# Patient Record
Sex: FEMALE | Race: ASIAN | NOT HISPANIC OR LATINO | Employment: STUDENT | URBAN - METROPOLITAN AREA
[De-identification: names, ages, dates, MRNs, and addresses within clinical notes are randomized per-mention and may not be internally consistent; named-entity substitution may affect disease eponyms.]

---
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
## 2019-07-17 ENCOUNTER — AMBULATORY - HEALTHEAST (OUTPATIENT)
Dept: FAMILY MEDICINE | Facility: CLINIC | Age: 19
End: 2019-07-17

## 2019-07-17 ENCOUNTER — COMMUNICATION - HEALTHEAST (OUTPATIENT)
Dept: FAMILY MEDICINE | Facility: CLINIC | Age: 19
End: 2019-07-17

## 2019-08-05 ENCOUNTER — COMMUNICATION - HEALTHEAST (OUTPATIENT)
Dept: FAMILY MEDICINE | Facility: CLINIC | Age: 19
End: 2019-08-05

## 2019-08-30 ENCOUNTER — OFFICE VISIT - HEALTHEAST (OUTPATIENT)
Dept: FAMILY MEDICINE | Facility: CLINIC | Age: 19
End: 2019-08-30

## 2019-08-30 DIAGNOSIS — Z23 NEED FOR TETANUS BOOSTER: ICD-10-CM

## 2019-08-30 DIAGNOSIS — Z71.84 TRAVEL ADVICE ENCOUNTER: ICD-10-CM

## 2019-08-30 ASSESSMENT — MIFFLIN-ST. JEOR: SCORE: 1363.82

## 2020-08-07 ENCOUNTER — RECORDS - HEALTHEAST (OUTPATIENT)
Dept: GENERAL RADIOLOGY | Facility: CLINIC | Age: 20
End: 2020-08-07

## 2020-08-07 ENCOUNTER — OFFICE VISIT - HEALTHEAST (OUTPATIENT)
Dept: FAMILY MEDICINE | Facility: CLINIC | Age: 20
End: 2020-08-07

## 2020-08-07 DIAGNOSIS — Z11.3 SCREEN FOR STD (SEXUALLY TRANSMITTED DISEASE): ICD-10-CM

## 2020-08-07 DIAGNOSIS — Z00.00 ROUTINE GENERAL MEDICAL EXAMINATION AT A HEALTH CARE FACILITY: ICD-10-CM

## 2020-08-07 DIAGNOSIS — R76.12 NONSPECIFIC REACTION TO CELL MEDIATED IMMUNITY MEASUREMENT OF GAMMA INTERFERON ANTIGEN RESPONSE WITHOUT ACTIVE TUBERCULOSIS: ICD-10-CM

## 2020-08-07 DIAGNOSIS — R76.12 POSITIVE QUANTIFERON-TB GOLD TEST: ICD-10-CM

## 2020-08-07 LAB
CLUE CELLS: NORMAL
FASTING STATUS PATIENT QL REPORTED: NO
GLUCOSE BLD-MCNC: 87 MG/DL (ref 74–125)
HGB BLD-MCNC: 12.8 G/DL (ref 12–16)
HIV 1+2 AB+HIV1 P24 AG SERPL QL IA: NEGATIVE
LDLC SERPL CALC-MCNC: 116 MG/DL
TRICHOMONAS, WET PREP: NORMAL
YEAST, WET PREP: NORMAL

## 2020-08-07 ASSESSMENT — MIFFLIN-ST. JEOR: SCORE: 1366.43

## 2020-08-08 LAB — T PALLIDUM AB SER QL: NEGATIVE

## 2020-08-10 LAB
HPV SOURCE: ABNORMAL
HUMAN PAPILLOMA VIRUS 16 DNA: NEGATIVE
HUMAN PAPILLOMA VIRUS 18 DNA: NEGATIVE
HUMAN PAPILLOMA VIRUS FINAL DIAGNOSIS: ABNORMAL
HUMAN PAPILLOMA VIRUS OTHER HR: POSITIVE
SPECIMEN DESCRIPTION: ABNORMAL

## 2020-08-11 LAB
C TRACH DNA SPEC QL PROBE+SIG AMP: NEGATIVE
N GONORRHOEA DNA SPEC QL NAA+PROBE: NEGATIVE

## 2020-09-18 ENCOUNTER — OFFICE VISIT - HEALTHEAST (OUTPATIENT)
Dept: FAMILY MEDICINE | Facility: CLINIC | Age: 20
End: 2020-09-18

## 2020-09-18 DIAGNOSIS — L98.9 SKIN LESION: ICD-10-CM

## 2020-09-18 DIAGNOSIS — F41.1 GENERALIZED ANXIETY DISORDER: ICD-10-CM

## 2020-12-31 ENCOUNTER — OFFICE VISIT - HEALTHEAST (OUTPATIENT)
Dept: FAMILY MEDICINE | Facility: CLINIC | Age: 20
End: 2020-12-31

## 2020-12-31 DIAGNOSIS — Z20.822 SUSPECTED COVID-19 VIRUS INFECTION: ICD-10-CM

## 2021-01-05 ENCOUNTER — OFFICE VISIT - HEALTHEAST (OUTPATIENT)
Dept: FAMILY MEDICINE | Facility: CLINIC | Age: 21
End: 2021-01-05

## 2021-01-05 DIAGNOSIS — Z30.011 ENCOUNTER FOR INITIAL PRESCRIPTION OF CONTRACEPTIVE PILLS: ICD-10-CM

## 2021-01-05 DIAGNOSIS — R53.82 CHRONIC FATIGUE: ICD-10-CM

## 2021-01-05 LAB
ALBUMIN SERPL-MCNC: 4.6 G/DL (ref 3.5–5)
ALP SERPL-CCNC: 68 U/L (ref 45–120)
ALT SERPL W P-5'-P-CCNC: 15 U/L (ref 0–45)
ANION GAP SERPL CALCULATED.3IONS-SCNC: 12 MMOL/L (ref 5–18)
AST SERPL W P-5'-P-CCNC: 22 U/L (ref 0–40)
BASOPHILS # BLD AUTO: 0 THOU/UL (ref 0–0.2)
BASOPHILS NFR BLD AUTO: 1 % (ref 0–2)
BILIRUB SERPL-MCNC: 0.4 MG/DL (ref 0–1)
BUN SERPL-MCNC: 19 MG/DL (ref 8–22)
CALCIUM SERPL-MCNC: 9.5 MG/DL (ref 8.5–10.5)
CHLORIDE BLD-SCNC: 103 MMOL/L (ref 98–107)
CO2 SERPL-SCNC: 25 MMOL/L (ref 22–31)
CREAT SERPL-MCNC: 0.79 MG/DL (ref 0.6–1.1)
EOSINOPHIL # BLD AUTO: 0.1 THOU/UL (ref 0–0.4)
EOSINOPHIL NFR BLD AUTO: 1 % (ref 0–6)
ERYTHROCYTE [DISTWIDTH] IN BLOOD BY AUTOMATED COUNT: 11.4 % (ref 11–14.5)
FERRITIN SERPL-MCNC: 13 NG/ML (ref 10–130)
GFR SERPL CREATININE-BSD FRML MDRD: >60 ML/MIN/1.73M2
GLUCOSE BLD-MCNC: 90 MG/DL (ref 70–125)
HCT VFR BLD AUTO: 41 % (ref 35–47)
HGB BLD-MCNC: 13.9 G/DL (ref 12–16)
IRON SATN MFR SERPL: 14 % (ref 20–50)
IRON SERPL-MCNC: 55 UG/DL (ref 42–175)
LYMPHOCYTES # BLD AUTO: 1.5 THOU/UL (ref 0.8–4.4)
LYMPHOCYTES NFR BLD AUTO: 28 % (ref 20–40)
MCH RBC QN AUTO: 31.9 PG (ref 27–34)
MCHC RBC AUTO-ENTMCNC: 34 G/DL (ref 32–36)
MCV RBC AUTO: 94 FL (ref 80–100)
MONOCYTES # BLD AUTO: 0.3 THOU/UL (ref 0–0.9)
MONOCYTES NFR BLD AUTO: 5 % (ref 2–10)
NEUTROPHILS # BLD AUTO: 3.4 THOU/UL (ref 2–7.7)
NEUTROPHILS NFR BLD AUTO: 65 % (ref 50–70)
PLATELET # BLD AUTO: 267 THOU/UL (ref 140–440)
PMV BLD AUTO: 8.7 FL (ref 7–10)
POTASSIUM BLD-SCNC: 4.7 MMOL/L (ref 3.5–5)
PROT SERPL-MCNC: 8.3 G/DL (ref 6–8)
RBC # BLD AUTO: 4.36 MILL/UL (ref 3.8–5.4)
SODIUM SERPL-SCNC: 140 MMOL/L (ref 136–145)
TIBC SERPL-MCNC: 399 UG/DL (ref 313–563)
TRANSFERRIN SERPL-MCNC: 319 MG/DL (ref 212–360)
TSH SERPL DL<=0.005 MIU/L-ACNC: 2.87 UIU/ML (ref 0.3–5)
VIT B12 SERPL-MCNC: 410 PG/ML (ref 213–816)
WBC: 5.3 THOU/UL (ref 4–11)

## 2021-01-06 LAB
25(OH)D3 SERPL-MCNC: 10.1 NG/ML (ref 30–80)
25(OH)D3 SERPL-MCNC: 10.1 NG/ML (ref 30–80)

## 2021-01-07 ENCOUNTER — AMBULATORY - HEALTHEAST (OUTPATIENT)
Dept: FAMILY MEDICINE | Facility: CLINIC | Age: 21
End: 2021-01-07

## 2021-01-07 DIAGNOSIS — E55.9 VITAMIN D DEFICIENCY: ICD-10-CM

## 2021-02-19 ENCOUNTER — AMBULATORY - HEALTHEAST (OUTPATIENT)
Dept: FAMILY MEDICINE | Facility: CLINIC | Age: 21
End: 2021-02-19

## 2021-02-19 ENCOUNTER — COMMUNICATION - HEALTHEAST (OUTPATIENT)
Dept: FAMILY MEDICINE | Facility: CLINIC | Age: 21
End: 2021-02-19

## 2021-02-19 ENCOUNTER — OFFICE VISIT - HEALTHEAST (OUTPATIENT)
Dept: FAMILY MEDICINE | Facility: CLINIC | Age: 21
End: 2021-02-19

## 2021-02-19 DIAGNOSIS — Z20.822 EXPOSURE TO COVID-19 VIRUS: ICD-10-CM

## 2021-03-25 ENCOUNTER — COMMUNICATION - HEALTHEAST (OUTPATIENT)
Dept: FAMILY MEDICINE | Facility: CLINIC | Age: 21
End: 2021-03-25

## 2021-04-29 ENCOUNTER — OFFICE VISIT (OUTPATIENT)
Dept: FAMILY MEDICINE | Facility: CLINIC | Age: 21
End: 2021-04-29
Payer: COMMERCIAL

## 2021-04-29 VITALS
SYSTOLIC BLOOD PRESSURE: 98 MMHG | WEIGHT: 133.4 LBS | DIASTOLIC BLOOD PRESSURE: 62 MMHG | HEIGHT: 62 IN | HEART RATE: 81 BPM | BODY MASS INDEX: 24.55 KG/M2 | RESPIRATION RATE: 15 BRPM | TEMPERATURE: 97.9 F

## 2021-04-29 DIAGNOSIS — Z86.2 HISTORY OF ANEMIA: ICD-10-CM

## 2021-04-29 DIAGNOSIS — Z11.3 SCREENING FOR STDS (SEXUALLY TRANSMITTED DISEASES): ICD-10-CM

## 2021-04-29 DIAGNOSIS — Z22.7 LATENT TUBERCULOSIS BY BLOOD TEST: ICD-10-CM

## 2021-04-29 DIAGNOSIS — Z12.4 SCREENING FOR MALIGNANT NEOPLASM OF CERVIX: ICD-10-CM

## 2021-04-29 DIAGNOSIS — R79.89 LOW VITAMIN D LEVEL: ICD-10-CM

## 2021-04-29 DIAGNOSIS — Z00.00 ROUTINE GENERAL MEDICAL EXAMINATION AT A HEALTH CARE FACILITY: Primary | ICD-10-CM

## 2021-04-29 LAB
BASOPHILS # BLD AUTO: 0 10E9/L (ref 0–0.2)
BASOPHILS NFR BLD AUTO: 0.4 %
DIFFERENTIAL METHOD BLD: NORMAL
EOSINOPHIL # BLD AUTO: 0 10E9/L (ref 0–0.7)
EOSINOPHIL NFR BLD AUTO: 0.6 %
ERYTHROCYTE [DISTWIDTH] IN BLOOD BY AUTOMATED COUNT: 12.2 % (ref 10–15)
HCT VFR BLD AUTO: 37.5 % (ref 35–47)
HGB BLD-MCNC: 11.9 G/DL (ref 11.7–15.7)
LYMPHOCYTES # BLD AUTO: 1.8 10E9/L (ref 0.8–5.3)
LYMPHOCYTES NFR BLD AUTO: 35.4 %
MCH RBC QN AUTO: 30.4 PG (ref 26.5–33)
MCHC RBC AUTO-ENTMCNC: 31.7 G/DL (ref 31.5–36.5)
MCV RBC AUTO: 96 FL (ref 78–100)
MONOCYTES # BLD AUTO: 0.3 10E9/L (ref 0–1.3)
MONOCYTES NFR BLD AUTO: 5.6 %
NEUTROPHILS # BLD AUTO: 3 10E9/L (ref 1.6–8.3)
NEUTROPHILS NFR BLD AUTO: 58 %
PLATELET # BLD AUTO: 257 10E9/L (ref 150–450)
RBC # BLD AUTO: 3.91 10E12/L (ref 3.8–5.2)
SPECIMEN SOURCE: ABNORMAL
WBC # BLD AUTO: 5.2 10E9/L (ref 4–11)
WET PREP SPEC: ABNORMAL

## 2021-04-29 PROCEDURE — 36415 COLL VENOUS BLD VENIPUNCTURE: CPT | Performed by: INTERNAL MEDICINE

## 2021-04-29 PROCEDURE — 85025 COMPLETE CBC W/AUTO DIFF WBC: CPT | Performed by: INTERNAL MEDICINE

## 2021-04-29 PROCEDURE — 99213 OFFICE O/P EST LOW 20 MIN: CPT | Mod: 25 | Performed by: INTERNAL MEDICINE

## 2021-04-29 PROCEDURE — 87210 SMEAR WET MOUNT SALINE/INK: CPT | Performed by: INTERNAL MEDICINE

## 2021-04-29 PROCEDURE — 87591 N.GONORRHOEAE DNA AMP PROB: CPT | Performed by: INTERNAL MEDICINE

## 2021-04-29 PROCEDURE — 82306 VITAMIN D 25 HYDROXY: CPT | Performed by: INTERNAL MEDICINE

## 2021-04-29 PROCEDURE — G0145 SCR C/V CYTO,THINLAYER,RESCR: HCPCS | Performed by: INTERNAL MEDICINE

## 2021-04-29 PROCEDURE — 86481 TB AG RESPONSE T-CELL SUSP: CPT | Performed by: INTERNAL MEDICINE

## 2021-04-29 PROCEDURE — 99385 PREV VISIT NEW AGE 18-39: CPT | Performed by: INTERNAL MEDICINE

## 2021-04-29 PROCEDURE — 87491 CHLMYD TRACH DNA AMP PROBE: CPT | Performed by: INTERNAL MEDICINE

## 2021-04-29 RX ORDER — NORGESTIMATE AND ETHINYL ESTRADIOL 7DAYSX3 LO
1 KIT ORAL
COMMUNITY
Start: 2021-01-05 | End: 2021-11-09

## 2021-04-29 ASSESSMENT — ENCOUNTER SYMPTOMS
CHILLS: 0
JOINT SWELLING: 0
ABDOMINAL PAIN: 0
NERVOUS/ANXIOUS: 0
HEMATURIA: 0
NAUSEA: 0
CONSTIPATION: 0
WEAKNESS: 0
FEVER: 0
MYALGIAS: 0
HEARTBURN: 0
SHORTNESS OF BREATH: 0
ARTHRALGIAS: 0
EYE PAIN: 0
DYSURIA: 0
DIZZINESS: 0
FREQUENCY: 0
COUGH: 0
SORE THROAT: 0
PARESTHESIAS: 0
PALPITATIONS: 0
DIARRHEA: 0
HEMATOCHEZIA: 0
HEADACHES: 0
BREAST MASS: 0

## 2021-04-29 ASSESSMENT — MIFFLIN-ST. JEOR: SCORE: 1318.35

## 2021-04-29 NOTE — PROGRESS NOTES
Chief Complaint   Patient presents with     Physical     w/ pap        SUBJECTIVE:   CC: Jessica Ramirez is an 21 year old woman who presents for preventive health visit.     Patient has been advised of split billing requirements and indicates understanding: Yes  Healthy Habits:     Getting at least 3 servings of Calcium per day:  Yes    Bi-annual eye exam:  Yes    Dental care twice a year:  Yes    Sleep apnea or symptoms of sleep apnea:  None    Diet:  Regular (no restrictions) and Breakfast skipped    Frequency of exercise:  1 day/week    Duration of exercise:  15-30 minutes    Taking medications regularly:  Yes    Medication side effects:  None    PHQ-2 Total Score: 1    Additional concerns today:  No        Today's PHQ-2 Score:   PHQ-2 ( 1999 [employer]) 4/29/2021   Q1: Little interest or pleasure in doing things 0   Q2: Feeling down, depressed or hopeless 1   PHQ-2 Score 1   Q1: Little interest or pleasure in doing things Not at all   Q2: Feeling down, depressed or hopeless Several days   PHQ-2 Score 1       Abuse: Current or Past (Physical, Sexual or Emotional) - No  Do you feel safe in your environment? Yes    Have you ever done Advance Care Planning? (For example, a Health Directive, POLST, or a discussion with a medical provider or your loved ones about your wishes): No, advance care planning information given to patient to review.  Patient declined advance care planning discussion at this time.    Social History     Tobacco Use     Smoking status: Never Smoker     Smokeless tobacco: Never Used   Substance Use Topics     Alcohol use: Yes     Comment: once a month       Alcohol Use 4/29/2021   Prescreen: >3 drinks/day or >7 drinks/week? No       Reviewed orders with patient.  Reviewed health maintenance and updated orders accordingly - Yes  Labs reviewed in EPIC  BP Readings from Last 3 Encounters:   04/29/21 98/62    Wt Readings from Last 3 Encounters:   04/29/21 60.5 kg (133 lb 6.4 oz)                  Patient Active  Problem List   Diagnosis     Cervical high risk HPV (human papillomavirus) test positive     History reviewed. No pertinent surgical history.    Social History     Tobacco Use     Smoking status: Never Smoker     Smokeless tobacco: Never Used   Substance Use Topics     Alcohol use: Yes     Comment: once a month     History reviewed. No pertinent family history.      Current Outpatient Medications   Medication Sig Dispense Refill     norgestim-eth estrad triphasic (ORTHO TRI-CYCLEN LO) 0.18/0.215/0.25 MG-25 MCG tablet Take 1 tablet by mouth       No Known Allergies        History of abnormal Pap smear: NO - age 21-29 PAP every 3 years recommended     Reviewed and updated as needed this visit by clinical staff  Tobacco  Allergies  Meds   Med Hx  Surg Hx  Fam Hx  Soc Hx        Reviewed and updated as needed this visit by Provider  Tobacco  Allergies  Meds  Problems  Med Hx  Surg Hx  Fam Hx         History reviewed. No pertinent past medical history.   History reviewed. No pertinent surgical history.    Review of Systems   Constitutional: Negative for chills and fever.   HENT: Negative for congestion, ear pain, hearing loss and sore throat.    Eyes: Negative for pain and visual disturbance.   Respiratory: Negative for cough and shortness of breath.    Cardiovascular: Negative for chest pain, palpitations and peripheral edema.   Gastrointestinal: Negative for abdominal pain, constipation, diarrhea, heartburn, hematochezia and nausea.   Breasts:  Negative for tenderness, breast mass and discharge.   Genitourinary: Positive for vaginal discharge. Negative for dysuria, frequency, genital sores, hematuria, pelvic pain, urgency and vaginal bleeding.   Musculoskeletal: Negative for arthralgias, joint swelling and myalgias.   Skin: Negative for rash.   Neurological: Negative for dizziness, weakness, headaches and paresthesias.   Psychiatric/Behavioral: Positive for mood changes. The patient is not nervous/anxious.   "   hx of Pos PPD;   Latent tuberculosis:  Fall 2017; took 3 of 6 months of therapy INH and B6; She was born in [place]; emig[place] to [place] at age 3 or 4 ( 2004 or 3)     OBJECTIVE:   BP 98/62   Pulse 81   Temp 97.9  F (36.6  C) (Oral)   Resp 15   Ht 1.575 m (5' 2\")   Wt 60.5 kg (133 lb 6.4 oz)   LMP 04/18/2021 (Approximate)   BMI 24.40 kg/m    Physical Exam  GENERAL: healthy, alert and no distress  EYES: Eyes grossly normal to inspection  HENT: ear canals and TM's normal, nose and mouth without ulcers or lesions  NECK: no adenopathy, no asymmetry, masses, or scars and thyroid normal to palpation  RESP: lungs clear to auscultation - no rales, rhonchi or wheezes  BREAST: normal without masses, tenderness or nipple discharge and no palpable axillary masses or adenopathy  CV: regular rate and rhythm, normal S1 S2, no S3 or S4, no murmur, click or rub, no peripheral edema and peripheral pulses strong  ABDOMEN: soft, nontender, without hepatosplenomegaly or masses and bowel sounds normal   (female): normal female external genitalia, normal urethral meatus , vaginal mucosa pink, moist, well rugated, normal cervix, adnexae, and uterus without masses. and pap and STD screening obtained.   MS: no gross musculoskeletal defects noted, no edema  SKIN: no suspicious lesions or rashes  NEURO: Normal strength and tone, mentation intact and speech normal  PSYCH: mentation appears normal, affect normal/bright    Diagnostic Test Results:  Labs reviewed in Epic    ASSESSMENT/PLAN:   (Z00.00) Routine general medical examination at a health care facility  (primary encounter diagnosis)  Comment: HEALTH CARE MAINTENANCE reviewed; immunizations reviewed  Plan: STD screening, pap, etc.     (Z11.3) Screening for STDs (sexually transmitted diseases)  Comment:   Plan: NEISSERIA GONORRHOEA PCR, CHLAMYDIA TRACHOMATIS        PCR, Wet prep          (Z12.4) Screening for malignant neoplasm of cervix  Comment: pap  Plan: Pap imaged thin layer " "screen only - recommended        age 21 - 24 years          (Z86.2) History of anemia  Comment: reassess HGB  Plan: CBC with platelets and differential          (R79.89) Low vitamin D level  Comment: 1/2021  Vit D 10   supplemet recommended, took for one month.   Plan: Vitamin D Deficiency        If low, recommend once weekly; pt indicated then that was prescribed for her but \"not very good about taking it\"    (Z22.7) Latent tuberculosis by blood test  Comment: Fall 2017; took 3 of 6 months of therapy INH and B6; recommend check TB Gold test.  born in [facility]; [place] age 3 or 4  Plan: Quantiferon TB Gold Plus        If negative, then adequately treated with 3 months of INH; if positive, may need to consult with Infectious disease to reassess treatment options.       Patient has been advised of split billing requirements and indicates understanding: Yes  COUNSELING:  Reviewed preventive health counseling, as reflected in patient instructions       Regular exercise       Healthy diet/nutrition       Safe sex practices/STD prevention    Estimated body mass index is 24.4 kg/m  as calculated from the following:    Height as of this encounter: 1.575 m (5' 2\").    Weight as of this encounter: 60.5 kg (133 lb 6.4 oz).        She reports that she has never smoked. She has never used smokeless tobacco.      Counseling Resources:  ATP IV Guidelines  Pooled Cohorts Equation Calculator  Breast Cancer Risk Calculator  BRCA-Related Cancer Risk Assessment: FHS-7 Tool  FRAX Risk Assessment  ICSI Preventive Guidelines  Dietary Guidelines for Americans, 2010  USDA's MyPlate  ASA Prophylaxis  Lung CA Screening    Azra Hinojosa MD  Internal Medicine   [facility]  "

## 2021-04-30 LAB
C TRACH DNA SPEC QL NAA+PROBE: NEGATIVE
DEPRECATED CALCIDIOL+CALCIFEROL SERPL-MC: 46 UG/L (ref 20–75)
N GONORRHOEA DNA SPEC QL NAA+PROBE: NEGATIVE
SPECIMEN SOURCE: NORMAL
SPECIMEN SOURCE: NORMAL

## 2021-05-01 LAB
GAMMA INTERFERON BACKGROUND BLD IA-ACNC: 0.17 IU/ML
M TB IFN-G CD4+ BCKGRND COR BLD-ACNC: 9.83 IU/ML
M TB TUBERC IFN-G BLD QL: NEGATIVE
MITOGEN IGNF BCKGRD COR BLD-ACNC: 0 IU/ML
MITOGEN IGNF BCKGRD COR BLD-ACNC: 0 IU/ML

## 2021-05-04 LAB
COPATH REPORT: NORMAL
PAP: NORMAL

## 2021-05-06 PROBLEM — R87.810 CERVICAL HIGH RISK HPV (HUMAN PAPILLOMAVIRUS) TEST POSITIVE: Status: ACTIVE | Noted: 2020-08-07

## 2021-05-18 ENCOUNTER — TELEPHONE (OUTPATIENT)
Dept: FAMILY MEDICINE | Facility: CLINIC | Age: 21
End: 2021-05-18

## 2021-05-18 NOTE — TELEPHONE ENCOUNTER
Dr. Hinojosa     21 yr old with current NIL pap. Hx of + HR HPV last year.  See below. Okay to recommend pap in 3 years or would you prefer different follow up?     8/7/20 NIL pap, + HR HPV (not 16 or 18)  4/29/21 NIL pap  (current)    Thank you  Lynette Nissen RN

## 2021-05-31 VITALS — WEIGHT: 146.5 LBS | HEIGHT: 62 IN | BODY MASS INDEX: 26.96 KG/M2

## 2021-05-31 NOTE — PROGRESS NOTES
ASSESSMENT/PLAN:  1. Travel advice encounter  azithromycin (ZITHROMAX) 250 MG tablet    Typhoid, Inactive, Inj    acetaZOLAMIDE (DIAMOX) 250 MG tablet   2. 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  Here for travel encounter.  WE have reviewed together the CDC recommendations for travel in this area of the world.  She is generally up to date for immunizations.  Recommend an influenza vaccine when available.  Will provide Azithromycin for travelers diarrhea; will give typhoid vaccine; recommend Acetazolamide for travel/hiking in high altitude.  Discussed at length.  She is due for tetanus booster - will be given today.      Total time of visit today was 35 minutes - all spent in face to face time with patient, discussing travel risks.   Return in about 1 year (around 8/30/2020) for Annual physical.      There are no discontinued medications.  There are no Patient Instructions on file for this visit.    Chief Complaint:  Chief Complaint   Patient presents with     travel visit     Roulette       HPI:   Jessica Ramirez is a 19 y.o. female c/o  Going on medical trip to [place] in January -   Going with school group - doesn't have any paperwork, doesn't know whole itinerary (but reviewed as well as we can)    PMH:   Patient Active Problem List    Diagnosis Date Noted     Positive QuantiFERON-TB Gold test 07/21/2017     Tinnitus of both ears 07/07/2017     History reviewed. No pertinent past medical history.  Past Surgical History:   Procedure Laterality Date     NO PAST SURGERIES       Social History     Socioeconomic History     Marital status: Single     Spouse name: Not on file     Number of children: Not on file     Years of education: Not on file     Highest education level: Not on file   Occupational History     Not on file   Social Needs     Financial resource strain: Not on file     Food insecurity:      Worry: Not on file     Inability: Not on file     Transportation needs:     Medical: Not on file     Non-medical: Not on file   Tobacco Use     Smoking status: Never Smoker     Smokeless tobacco: Never Used   Substance and Sexual Activity     Alcohol use: No     Drug use: No     Sexual activity: Never   Lifestyle     Physical activity:     Days per week: Not on file     Minutes per session: Not on file     Stress: Not on file   Relationships     Social connections:     Talks on phone: Not on file     Gets together: Not on file     Attends Mor[employer] service: Not on file     Active member of club or organization: Not on file     Attends meetings of clubs or organizations: Not on file     Relationship status: Not on file     Intimate partner violence:     Fear of current or ex partner: Not on file     Emotionally abused: Not on file     Physically abused: Not on file     Forced sexual activity: Not on file   Other Topics Concern     Not on file   Social History Narrative    Born in [facility]    Will attend [employer] - starting fall 2017     Family History   Problem Relation Age of Onset     Gestational diabetes Mother      Hyperlipidemia Father        Meds:    Current Outpatient Medications:      acetaZOLAMIDE (DIAMOX) 250 MG tablet, Take 1 tablet (250 mg total) by mouth 3 (three) times a day. Start 48 hours before ascent and continue for 72 hours at high altitude., Disp: 30 tablet, Rfl: 0     isoniazid (NYDRAZID) 300 MG tablet, TAKE 1 PILL BY MOUTH EVERY DAY/TXHUA HNUB NOJ 1 LUB TSHUAJ, Disp: 30 tablet, Rfl: 7     VITAMIN B-6 50 MG tablet, TAKE 1 PILL BY MOUTH EVERY DAY/TXHUA HNUB NOJ 1 LUB TSHUAJ, Disp: 30 tablet, Rfl: 7    Allergies:  No Known Allergies    ROS:  Pertinent positives as noted in HPI; otherwise 12 point ROS negative.      Physical Exam:  EXAM:  /60 (Patient Site: Left Arm, Patient Position: Sitting, Cuff Size: Adult Regular)   Pulse 72   Temp 98.2  F (36.8  C) (Oral)   Resp  "16   Ht 5' 1.75\" (1.568 m)   Wt 144 lb 4.8 oz (65.5 kg)   LMP 08/14/2019   Breastfeeding? No   BMI 26.61 kg/m     Gen:  NAD, appears well, well-hydrated  HEENT:  TMs nl, oropharynx benign, nasal mucosa nl, conjunctiva clear  Neck:  Supple, no adenopathy, no thyromegaly, no carotid bruits, no JVD  Lungs:  Clear to auscultation bilaterally  Cor:  RRR no murmur  Abd:  Soft, nontender, BS+, no masses, no guarding or rebound, no HSM  Extr:  Neg.  Neuro:  No asymmetry  Skin:  Warm/dry        Results:  Results for orders placed or performed in visit on 07/07/17   QTF-Mycobacterium tuberculosis by QuantiFERON-TB Gold   Result Value Ref Range    QTF RESULT Positive (!) Negative    QTF INTREPRETATION       Interferon-gamma response to M. tuberculosis antigens detected, suggesting infection with M. tuberculosis.  TB Ag - Nil results between 0.35 - 1.11 IU/mL in pts at low-risk for TB should be interpreted with caution and rpt testing should be considered.    QTF NIL 1.47 IU/mL    QTF TB ANTIGEN - NIL 1.30 IU/mL    QTF MITOGEN - NIL >10.00 IU/mL             "

## 2021-06-03 VITALS — WEIGHT: 144.3 LBS | HEIGHT: 62 IN | BODY MASS INDEX: 26.55 KG/M2

## 2021-06-04 VITALS
HEART RATE: 73 BPM | SYSTOLIC BLOOD PRESSURE: 115 MMHG | BODY MASS INDEX: 26.5 KG/M2 | DIASTOLIC BLOOD PRESSURE: 77 MMHG | RESPIRATION RATE: 16 BRPM | TEMPERATURE: 98.1 F | HEIGHT: 62 IN | WEIGHT: 144 LBS

## 2021-06-05 VITALS
WEIGHT: 133 LBS | RESPIRATION RATE: 16 BRPM | TEMPERATURE: 97.9 F | HEART RATE: 76 BPM | SYSTOLIC BLOOD PRESSURE: 108 MMHG | BODY MASS INDEX: 24.33 KG/M2 | DIASTOLIC BLOOD PRESSURE: 75 MMHG

## 2021-06-10 NOTE — PROGRESS NOTES
Assessment:      Healthy female exam.    1. Routine general medical examination at a health care facility  Gynecologic Cytology (PAP Smear)    LDL Cholesterol, Direct    Hemoglobin    Glucose    HPV High Risk DNA Cervical   2. Positive QuantiFERON-TB Gold test  XR Chest 2 Views   3. Screen for STD (sexually transmitted disease)  Wet Prep, Vaginal    Chlamydia trachomatis & Neisseria gonorrhoeae, Amplified Detection    HIV Antigen/Antibody Screening [place]    Syphilis Screen, [place]          Plan:      This is a 21 yo female here for physical exam/immunization review for school.   1.  Health Maintenance - due for first pap smear - discussed - done/sent.  Also check labs.  2.  H/o Positive quantiferon test - needs chest xray - reviewed personally - appears negative for any sign of TB  3.  Screen for STd - patient desires screening - no symptoms - see orders    Subjective:      Jessica Ramirez is a 20 y.o. female who presents for an annual exam.The patient reports that there is not domestic violence in her life.     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  Needs verification of immunizations; also has known h/o positive quantiferon    Healthy Habits:   Regular Exercise: Yes  Sunscreen Use: Yes  Healthy Diet: Yes  Dental Visits Regularly: Yes  Seat Belt: Yes  Sexually active: Yes  Self Breast Exam Monthly:No        Immunization History   Administered Date(s) Administered     DTaP, historic 05/24/2004, 10/13/2004, 02/16/2005, 11/01/2011     HPV Quadrivalent 11/01/2011, 05/08/2012, 09/07/2012     Hep A, historic 06/01/2009, 07/30/2010     Hep B, historic 05/24/2004, 10/13/2004, 02/16/2005     IPV 05/24/2004, 10/13/2004, 02/16/2005     Influenza, inj, historic,unspecified 02/16/2005, 11/08/2006, 11/30/2010, 09/27/2012     Influenza,seasonal, Inj IIV3 11/15/2013     MMR 05/24/2004, 10/13/2004     Meningococcal MCV4 Conjugate,Unspecified 07/30/2010     Meningococcal MCV4P 07/30/2010, 07/07/2017     Td, adult adsorbed, PF 08/30/2019     Tdap  2011     Typhoid, Inj, Inactive 2019     Varicella 2004, 10/13/2004     Immunization status: reviewed.    No exam data present    Gynecologic History  Patient's last menstrual period was 2020.  Contraception: none - discussed  Last Pap: none previous - done today. Results were: today's results obviously pending  Last mammogram: na. Results were: na      OB History    Para Term  AB Living   0 0 0 0 0 0   SAB TAB Ectopic Multiple Live Births   0 0 0 0 0       Current Outpatient Medications   Medication Sig Dispense Refill     acetaZOLAMIDE (DIAMOX) 250 MG tablet Take 1 tablet (250 mg total) by mouth 3 (three) times a day. Start 48 hours before ascent and continue for 72 hours at high altitude. 30 tablet 0     isoniazid (NYDRAZID) 300 MG tablet TAKE 1 PILL BY MOUTH EVERY DAY/TXHUA HNUB NOJ 1 LUB TSHUAJ 30 tablet 7     VITAMIN B-6 50 MG tablet TAKE 1 PILL BY MOUTH EVERY DAY/TXHUA HNUB NOJ 1 LUB TSHUAJ 30 tablet 7     No current facility-administered medications for this visit.      History reviewed. No pertinent past medical history.  Past Surgical History:   Procedure Laterality Date     NO PAST SURGERIES       Patient has no known allergies.  Family History   Problem Relation Age of Onset     Gestational diabetes Mother      Hyperlipidemia Father      Social History     Socioeconomic History     Marital status: Single     Spouse name: Not on file     Number of children: Not on file     Years of education: Not on file     Highest education level: Not on file   Occupational History     Not on file   Social Needs     Financial resource strain: Not on file     Food insecurity     Worry: Not on file     Inability: Not on file     Transportation needs     Medical: Not on file     Non-medical: Not on file   Tobacco Use     Smoking status: Never Smoker     Smokeless tobacco: Never Used   Substance and Sexual Activity     Alcohol use: No     Drug use: No     Sexual activity: Never  "  Lifestyle     Physical activity     Days per week: Not on file     Minutes per session: Not on file     Stress: Not on file   Relationships     Social connections     Talks on phone: Not on file     Gets together: Not on file     Attends Quaker service: Not on file     Active member of club or organization: Not on file     Attends meetings of clubs or organizations: Not on file     Relationship status: Not on file     Intimate partner violence     Fear of current or ex partner: Not on file     Emotionally abused: Not on file     Physically abused: Not on file     Forced sexual activity: Not on file   Other Topics Concern     Not on file   Social History Narrative    Born in [facility]    Will attend [employer] - starting fall 2017       Review of Systems  Review of Systems      Pertinent positives as noted in HPI; otherwise 12 point ROS negative.      Objective:         Vitals:    08/07/20 1453   BP: 115/77   Pulse: 73   Resp: 16   Temp: 98.1  F (36.7  C)   Weight: 144 lb (65.3 kg)   Height: 5' 2\" (1.575 m)     Body mass index is 26.34 kg/m .    Physical  Physical Exam    EXAM:  /77 (Patient Site: Right Arm, Patient Position: Sitting, Cuff Size: Adult Regular)   Pulse 73   Temp 98.1  F (36.7  C)   Resp 16   Ht 5' 2\" (1.575 m)   Wt 144 lb (65.3 kg)   LMP 08/06/2020   BMI 26.34 kg/m     Gen:  NAD, appears well, well-hydrated  HEENT:  TMs nl, oropharynx benign, nasal mucosa nl, conjunctiva clear  Neck:  Supple, no adenopathy, no thyromegaly, no carotid bruits, no JVD  Lungs:  Clear to auscultation bilaterally  Breast exam:  No breast lumps, no skin changes, no nipple discharge, no axillary adenopathy  Cor:  RRR no murmur  Abd:  Soft, nontender, BS+, no masses, no guarding or rebound, no HSM  PELVIC EXAM:External genitalia: normal  Vaginal mucosa normal  Vaginal discharge:white  Speculum exam shows a normal appearing cervix .   Bimanual exam: Cervix closed, firm, non tender  to motion.  " Uterus  firm, regular, mobile, non tender to palpation. No adnexal masses or tenderness.   Extr:  Neg.  Neuro:  No asymmetry  Skin:  Warm/dry      Xr Chest 2 Views    Result Date: 8/7/2020  EXAM: XR CHEST 2 VIEWS LOCATION: [facility] DATE/TIME: 8/7/2020 4:07 PM INDICATION: h/o positive quantiferon COMPARISON: 9/13/2004.     The lungs are clear of focal consolidation. There is no pneumothorax or pleural effusion. The heart size and pulmonary vascularity are normal. There is no radiographic evidence for active pulmonary tuberculosis

## 2021-06-11 NOTE — PROGRESS NOTES
"Jessica Ramirez is a 20 y.o. female who is being evaluated via a billable video visit.      The patient has been notified of following:     \"This video visit will be conducted via a call between you and your physician/provider. We have found that certain health care needs can be provided without the need for an in-person physical exam.  This service lets us provide the care you need with a video conversation.  If a prescription is necessary we can send it directly to your pharmacy.  If lab work is needed we can place an order for that and you can then stop by our lab to have the test done at a later time.    Video visits are billed at different rates depending on your insurance coverage. Please reach out to your insurance provider with any questions.    If during the course of the call the physician/provider feels a video visit is not appropriate, you will not be charged for this service.\"    Patient has given verbal consent to a Video visit? Yes  How would you like to obtain your AVS? AVS Preference: MyChart.  If dropped by the video visit, the video invitation should be sent to: Text to cell phone: [phone]  Will anyone else be joining your video visit? No        Video Start Time: 7:50 AM    Additional provider notes:     ASSESSMENT/PLAN:  1. Generalized anxiety disorder  sertraline (ZOLOFT) 50 MG tablet    AMB REFERRAL TO MENTAL HEALTH AND ADDICTION  - Adult (18+); Outpatient Treatment; Individual/Couples/Family/Group Therapy/Health Psychology; [facility]; [facility]; We will contact you to schedule the appointment or pleas...   2. Skin lesion  Ambulatory referral to Dermatology       This is a 19 yo female with:  1.  Generalized anxiety - she is finding it difficult to keep up with her work - currently a student at [employer].  She notes that both the COVID19 pandemic and civil unrest has made her anxiety worse.  Perhaps her school work makes her anxiety worse.  None the less she is " "struggling currently.  We discussed options - she agrees to low dose medication - will start Sertraline.  And she agrees to counseling, which I think may be more effective for her.  Will refer for counseling.  Along with this, she feels that a therapy animal may be helpful for her.  She could have a pet in her dorm room if we give her a note for this.  She finds living alone in the dorm room is very isolating and increases her symptoms.  Letter written and accessible via ZEALER.  2.  Skin lesion - patient has several moles and \"spots\" that she is sure are worrisome.  We have made a referral to Dermatology for evaluation as these are difficult to assess via phone.    Return in about 1 month (around 10/18/2020) for Recheck.      There are no discontinued medications.  There are no Patient Instructions on file for this visit.    Chief Complaint:  Chief Complaint   Patient presents with     Anxiety     Mass     on skin       HPI:   Jessica Ramirez is a 20 y.o. female c/o  1.  Anxiety - almost every single day -   Causes her to be nauseated - feels like she should throw up  Distracts her from her schoolwork  Doesn't throw up  Started x few years  Usually around school year  In beginning, social anxiety - then, getting worse - doesn't feel like it's social anxiety anymore, but now it's stress/other factors  Just recently, since school started -   [employer] - senior - nursing -   Feels tired really easily     Never been treated for anxiety -  No family history    2. Prone to getting \"sun spots\"  Worried about moles    3.  Wants a doctor's note to get a pet -   Living alone at the moment  Feels a reason why she is feeling down at the moment is that she is alone -   Has had rabbits in the past   Send a note to ZEALER        PMH:   Patient Active Problem List    Diagnosis Date Noted     Cervical high risk HPV (human papillomavirus) test positive 08/07/2020     Positive QuantiFERON-TB Gold test 07/21/2017     Tinnitus of both ears " 07/07/2017     History reviewed. No pertinent past medical history.  Past Surgical History:   Procedure Laterality Date     NO PAST SURGERIES       Social History     Socioeconomic History     Marital status: Single     Spouse name: Not on file     Number of children: Not on file     Years of education: Not on file     Highest education level: Not on file   Occupational History     Not on file   Social Needs     Financial resource strain: Not on file     Food insecurity     Worry: Not on file     Inability: Not on file     Transportation needs     Medical: Not on file     Non-medical: Not on file   Tobacco Use     Smoking status: Never Smoker     Smokeless tobacco: Never Used   Substance and Sexual Activity     Alcohol use: No     Drug use: No     Sexual activity: Never   Lifestyle     Physical activity     Days per week: Not on file     Minutes per session: Not on file     Stress: Not on file   Relationships     Social connections     Talks on phone: Not on file     Gets together: Not on file     Attends Mormon service: Not on file     Active member of club or organization: Not on file     Attends meetings of clubs or organizations: Not on file     Relationship status: Not on file     Intimate partner violence     Fear of current or ex partner: Not on file     Emotionally abused: Not on file     Physically abused: Not on file     Forced sexual activity: Not on file   Other Topics Concern     Not on file   Social History Narrative    Born in [place], to [place] 2005    Will attend [employer] - starting fall 2017     Family History   Problem Relation Age of Onset     Gestational diabetes Mother      Hyperlipidemia Father        Meds:    Current Outpatient Medications:      acetaZOLAMIDE (DIAMOX) 250 MG tablet, Take 1 tablet (250 mg total) by mouth 3 (three) times a day. Start 48 hours before ascent and continue for 72 hours at high altitude., Disp: 30 tablet, Rfl: 0     isoniazid (NYDRAZID) 300 MG tablet, TAKE 1  PILL BY MOUTH EVERY DAY/JUSTIN HNUB NOJ 1 LUB TSHUAJ, Disp: 30 tablet, Rfl: 7     VITAMIN B-6 50 MG tablet, TAKE 1 PILL BY MOUTH EVERY DAY/TXJANUARYA HNUB NOJ 1 LUB TSHUAJ, Disp: 30 tablet, Rfl: 7     sertraline (ZOLOFT) 50 MG tablet, Take 1 tablet (50 mg total) by mouth daily., Disp: 30 tablet, Rfl: 3    Allergies:  No Known Allergies    ROS:  Pertinent positives as noted in HPI; otherwise 12 point ROS negative.      Physical Exam:  EXAM:  There were no vitals taken for this visit.   Gen:  NAD, appears well, well-hydrated  Extr:  Neg.  Neuro:  No asymmetry  Skin:  Warm/dry, several hyperpigmented lesions        Video-Visit Details    Type of service:  Video Visit    Video End Time (time video stopped): 8:18 AM  Originating Location (pt. Location): Home    Distant Location (provider location):  [facility]/OB     Platform used for Video Visit: Cara Gallegos MD

## 2021-06-11 NOTE — PROGRESS NOTES
ASSESSMENT/PLAN:  1. Routine health maintenance  QTF-Mycobacterium tuberculosis by QuantiFERON-TB Gold    Meningococcal MCV4P   2. Tinnitus of both ears  Ambulatory referral to ENT       This is an 17 year old female - new to our clinic - generally healthy.  Will attend [employer] this fall - needs TB testing due to birthplace = [facility].  Discussed.  I think it makes the most sense to do Quantiferon testing.  We will complete her paperwork when labs are available and call patient to pick it up.    Also - patient c/o bilateral tinnitus - with no predisposing illness, medication use; no associated hearing loss or vertigo.  Will refer to ENT for evaluation and reassurance.    Patient also due for meningococcal vaccination -given that she will be attending college - and living in dorms, she should do this today.       There are no discontinued medications.  There are no Patient Instructions on file for this visit.    Chief Complaint:  Chief Complaint   Patient presents with     Tinnitus     Paperwork     PPD Reading       HPI:   Jessica Ramirez is a 17 y.o. female c/o  Here for [employer] stuff - needs a TB test - born in [facility], no known obvious exposure to TB  No fam hx TB  No personal hx of positive TB test, symptoms or diagnosis    Has some ear symptoms as well - c/o ringing in both ears - since January  No significant loud noise exposure  No perceived hearing loss  No vertigo  No long-term antibiotic use; no significant h/o antibiotic use    PMH:   Patient Active Problem List    Diagnosis Date Noted     Tinnitus of both ears 07/07/2017     History reviewed. No pertinent past medical history.  Past Surgical History:   Procedure Laterality Date     NO PAST SURGERIES       Social History     Social History     Marital status: Single     Spouse name: N/A     Number of children: N/A     Years of education: N/A     Occupational History     Not on file.     Social History Main Topics     Smoking status: Never Smoker      "Smokeless tobacco: Never Used     Alcohol use No     Drug use: No     Sexual activity: No     Other Topics Concern     Not on file     Social History Narrative    Born in [place], to [place] 2005    Will attend [employer] - starting fall 2017           Meds:  No current outpatient prescriptions on file.    Allergies:  No Known Allergies    ROS:  Pertinent positives as noted in HPI; otherwise 12 point ROS negative.      Physical Exam:  EXAM:  /66  Pulse 76  Temp 99.3  F (37.4  C) (Oral)   Resp 14  Ht 5' 1.75\" (1.568 m)  Wt 146 lb 8 oz (66.5 kg)  LMP 07/01/2017 (Approximate)  BMI 27.01 kg/m2   Gen:  NAD, appears well, well-hydrated  HEENT:  TMs nl, oropharynx benign, nasal mucosa nl, conjunctiva clear  Neck:  Supple, no adenopathy, no thyromegaly,  Lungs:  Clear to auscultation bilaterally  Cor:  RRR no murmur  Abd:  Soft, nontender, BS+, no masses, no guarding or rebound, no HSM  Extr:  Neg.  Neuro:  No asymmetry  Skin:  Warm/dry        Results:  No results found for this or any previous visit.          "

## 2021-06-12 NOTE — PROGRESS NOTES
16 yo with positive TB (quantiferon) test - discussed/reviewed.  Will treat with INH (and pyridoxine) x 9 months  Rx written.

## 2021-06-14 NOTE — PROGRESS NOTES
"ASSESSMENT/PLAN:  1. Chronic fatigue  Iron and Transferrin Iron Binding Capacity    Vitamin B12    HM1(CBC and Differential)    Ferritin    Thyroid Stimulating Hormone (TSH)    Comprehensive Metabolic Panel    Vitamin D, Total (25-Hydroxy)   2. Encounter for initial prescription of contraceptive pills  norgestimate-ethinyl estradioL (ORTHO TRI-CYCLEN LO) 0.18/0.215/0.25 mg-25 mcg tablet       This is a 19 yo female with:  1.  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  Will check labs to rule out metabolic possibilities  2.  Wants contraception - thinks she'd like to try OCP -   We have discussed currently available contraceptive options including:  a.  Combination contraception - OCP, Nuvaring, patch - benefits include predictable bleeding; risks include HTN, migraines, DVT; downside is needs frequent intervention (daily for the pill, monthly for the Nuvaring, weekly for the patch).  b.  Progesterone only contraception - Depo Provera, Nexplanon, Mirena IUD - benefits include less intervention (q 3 months for Depo, q 3 years for Nexplanon, q 5 years for Mirena IUD (other IUDs may be more frequent)); risks include irregular bleeding  c.  Non-hormonal contraception - condoms, Paragard IUD - benefits include no hormones; less intervention (for Paragard - lasts up to 10 years); risks include need for regular use for condoms, risks include IUD risk (possibility of malpositioning, intra-abdominal position for IUD)  d.  Surgical - tubal ligation - discussed risk (surgical, irreversible), benefit (lifelong - no need for hormonal or further birth control)    After discussion, she has opted for OCP - will have her start these today.    Return in about 3 months (around " "4/5/2021) for Recheck, if not getting better.      There are no discontinued medications.  There are no Patient Instructions on file for this visit.    Chief Complaint:  Chief Complaint   Patient presents with     Contraception     discuss options       HPI:   Jessica Ramirez is a 20 y.o. 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 No pertinent past medical history.  Past Surgical History:   Procedure Laterality Date     NO PAST SURGERIES       Social History     Socioeconomic History     Marital status: Single     Spouse name: Not on file     Number of children: Not on file     Years of education: Not on file     Highest education level: Not on file   Occupational History     Not on file   Social Needs     Financial resource strain: Not on file     Food insecurity     Worry: Not on file     Inability: Not on file     Transportation needs     Medical: Not on file     Non-medical: Not on file   Tobacco Use     Smoking status: Never Smoker     Smokeless tobacco: Never Used   Substance and Sexual Activity     Alcohol use: No     Drug use: No     Sexual activity: Never   Lifestyle     Physical activity     Days per week: Not on file     Minutes per session: Not on file     Stress: Not on file   Relationships     Social connections     Talks on phone: Not on file     Gets together: Not on file     Attends Yazidi service: Not on file     Active member of club or organization: Not on file     Attends meetings of clubs or organizations: Not on file     Relationship status: Not on file     Intimate partner violence     Fear of current or ex partner: Not on file     " Emotionally abused: Not on file     Physically abused: Not on file     Forced sexual activity: Not on file   Other Topics Concern     Not on file   Social History Narrative    Born in [place], to [place] 2005    Will attend [employer] - starting fall 2017     Family History   Problem Relation Age of Onset     Gestational diabetes Mother      Hyperlipidemia Father        Meds:    Current Outpatient Medications:      acetaZOLAMIDE (DIAMOX) 250 MG tablet, Take 1 tablet (250 mg total) by mouth 3 (three) times a day. Start 48 hours before ascent and continue for 72 hours at high altitude., Disp: 30 tablet, Rfl: 0     cholecalciferol, vitamin D3, 1,250 mcg (50,000 unit) capsule, Take 50,000 Units by mouth once a week., Disp: 13 capsule, Rfl: 1     isoniazid (NYDRAZID) 300 MG tablet, TAKE 1 PILL BY MOUTH EVERY DAY/TXHUA HNUB NOJ 1 LUB TSHUAJ, Disp: 30 tablet, Rfl: 7     norgestimate-ethinyl estradioL (ORTHO TRI-CYCLEN LO) 0.18/0.215/0.25 mg-25 mcg tablet, Take 1 tablet by mouth daily., Disp: 84 tablet, Rfl: 3     sertraline (ZOLOFT) 50 MG tablet, Take 1 tablet (50 mg total) by mouth daily., Disp: 30 tablet, Rfl: 3     VITAMIN B-6 50 MG tablet, TAKE 1 PILL BY MOUTH EVERY DAY/TXHUA HNUB NOJ 1 LUB TSHUAJ, Disp: 30 tablet, Rfl: 7    Allergies:  No Known Allergies    ROS:  Pertinent positives as noted in HPI; otherwise 12 point ROS negative.      Physical Exam:  EXAM:  /75 (Patient Site: Right Arm, Patient Position: Sitting, Cuff Size: Adult Regular)   Pulse 76   Temp 97.9  F (36.6  C) (Temporal)   Resp 16   Wt 133 lb (60.3 kg)   LMP 12/28/2020 (Exact Date)   BMI 24.33 kg/m     Gen:  NAD, appears well, well-hydrated  HEENT:  TMs nl, oropharynx benign, nasal mucosa nl, conjunctiva clear  Neck:  Supple, no adenopathy, no thyromegaly, no carotid bruits, no JVD  Lungs:  Clear to auscultation bilaterally  Cor:  RRR no murmur  Abd:  Soft, nontender, BS+, no masses, no guarding or rebound, no HSM  Extr:  Neg.  Neuro:  No  asymmetry  Skin:  Warm/dry        Results:  Results for orders placed or performed in visit on 01/05/21   Iron and Transferrin Iron Binding Capacity   Result Value Ref Range    Iron 55 42 - 175 ug/dL    Transferrin 319 212 - 360 mg/dL    Transferrin Saturation, Calculated 14 (L) 20 - 50 %    Transferrin IBC, Calculated 399 313 - 563 ug/dL   Vitamin B12   Result Value Ref Range    Vitamin B-12 410 213 - 816 pg/mL   Ferritin   Result Value Ref Range    Ferritin 13 10 - 130 ng/mL   Thyroid Stimulating Hormone (TSH)   Result Value Ref Range    TSH 2.87 0.30 - 5.00 uIU/mL   Comprehensive Metabolic Panel   Result Value Ref Range    Sodium 140 136 - 145 mmol/L    Potassium 4.7 3.5 - 5.0 mmol/L    Chloride 103 98 - 107 mmol/L    CO2 25 22 - 31 mmol/L    Anion Gap, Calculation 12 5 - 18 mmol/L    Glucose 90 70 - 125 mg/dL    BUN 19 8 - 22 mg/dL    Creatinine 0.79 0.60 - 1.10 mg/dL    GFR MDRD Af Amer >60 >60 mL/min/1.73m2    GFR MDRD Non Af Amer >60 >60 mL/min/1.73m2    Bilirubin, Total 0.4 0.0 - 1.0 mg/dL    Calcium 9.5 8.5 - 10.5 mg/dL    Protein, Total 8.3 (H) 6.0 - 8.0 g/dL    Albumin 4.6 3.5 - 5.0 g/dL    Alkaline Phosphatase 68 45 - 120 U/L    AST 22 0 - 40 U/L    ALT 15 0 - 45 U/L   Vitamin D, Total (25-Hydroxy)   Result Value Ref Range    Vitamin D, Total (25-Hydroxy) 10.1 (L) 30.0 - 80.0 ng/mL   HM1 (CBC with Diff)   Result Value Ref Range    WBC 5.3 4.0 - 11.0 thou/uL    RBC 4.36 3.80 - 5.40 mill/uL    Hemoglobin 13.9 12.0 - 16.0 g/dL    Hematocrit 41.0 35.0 - 47.0 %    MCV 94 80 - 100 fL    MCH 31.9 27.0 - 34.0 pg    MCHC 34.0 32.0 - 36.0 g/dL    RDW 11.4 11.0 - 14.5 %    Platelets 267 140 - 440 thou/uL    MPV 8.7 7.0 - 10.0 fL    Neutrophils % 65 50 - 70 %    Lymphocytes % 28 20 - 40 %    Monocytes % 5 2 - 10 %    Eosinophils % 1 0 - 6 %    Basophils % 1 0 - 2 %    Neutrophils Absolute 3.4 2.0 - 7.7 thou/uL    Lymphocytes Absolute 1.5 0.8 - 4.4 thou/uL    Monocytes Absolute 0.3 0.0 - 0.9 thou/uL    Eosinophils  Absolute 0.1 0.0 - 0.4 thou/uL    Basophils Absolute 0.0 0.0 - 0.2 thou/uL

## 2021-06-14 NOTE — PATIENT INSTRUCTIONS - HE
Dear Jessica Ramirez,    Your symptoms show that you may have coronavirus (COVID-19). This illness can cause fever, cough and trouble breathing. Many people get a mild case and get better on their own. Some people can get very sick.    Will I be tested for COVID-19?  We would like to test you for Covid-19 virus. I have placed orders for this test.     To schedule: go to your [employer] home page and scroll down to the section that says  You have an appointment that needs to be scheduled  and click the large green button that says  Schedule Now  and follow the steps to find the next available openings.    If you are unable to complete these Dragon[employer] scheduling steps, please call [phone] to schedule your testing.     Return to work/school/ guidance:  Please let your workplace manager and staffing office know when your quarantine ends     We can t give you an exact date as it depends on the above. You can calculate this on your own or work with your manager/staffing office to calculate this. (For example if you were exposed on 10/4, you would have to quarantine for 14 full days. That would be through 10/18. You could return on 10/19.)      If you receive a positive COVID-19 test result, follow the guidance of the those who are giving you the results. Usually the return to work is 10 (or in some cases 20 days from symptom onset.) If you work at [facility], you must also be cleared by Employee Occupational Health and Safety to return to work.        If you receive a negative COVID-19 test result and did not have a high risk exposure to someone with a known positive COVID-19 test, you can return to work once you're free of fever for 24 hours without fever-reducing medication and your symptoms are improving or resolved.      If you receive a negative COVID-19 test and If you had a high risk exposure to someone who has tested positive for COVID-19 then you can return to work 14 days after your last contact with the  positive individual    Note: If you have ongoing exposure to the covid positive person, this quarantine period may be more than 14 days. (For example, if you are continued to be exposed to your child who tested positive and cannot isolate from them, then the quarantine of 7-14 days can't start until your child is no longer contagious. This is typically 10 days from onset of the child's symptoms. So the total duration may be 17-24 days in this case.)    Sign up for [employer].   We know it's scary to hear that you might have COVID-19. We want to track your symptoms to make sure you're okay over the next 2 weeks. Please look for an email from [employer]--this is a free, online program that we'll use to keep in touch. To sign up, follow the link in the email you will receive. Learn more at [email or web address]    How can I take care of myself?    Get lots of rest. Drink extra fluids (unless a doctor has told you not to)    Take Tylenol (acetaminophen) or ibuprofen for fever or pain. If you have liver or kidney problems, ask your family doctor if it's okay to take Tylenol o ibuprofen    If you have other health problems (like cancer, heart failure, an organ transplant or severe kidney disease): Call your specialty clinic if you don't feel better in the next 2 days.    Know when to call 911. Emergency warning signs include:  o Trouble breathing or shortness of breath  o Pain or pressure in the chest that doesn't go away  o Feeling confused like you haven't felt before, or not being able to wake up  o Bluish-colored lips or face    Where can I get more information?  [facility] - About COVID-19:   [email or web address]    CDC - What to Do If You're Sick:   www.cdc.gov/coronavirus/2019-ncov/about/steps-when-sick.html      Dear Jessica Ramirez,    Your symptoms show that you may have coronavirus (COVID-19). This illness can cause fever, cough and trouble breathing. Many people get a mild case and  get better on their own. Some people can get very sick.    Will I be tested for COVID-19?  We would like to test you for Covid-19 virus. I have placed orders for this test.     For all employees or close contacts (except [place] and [employer] - see below), go to your [employer] home page and scroll down to the section that says  You have an appointment that needs to be scheduled  and click the large green button that says  Schedule Now  and follow the steps to find the next available opening.     If you are unable to complete these steps or if you cannot find any available times, please call [phone] to schedule employee testing.     [place] employees or close contacts, please call [phone].   [place] ([employer]) employees or close contacts call [phone].    Return to work/school/ guidance:  Please let your workplace manager and staffing office know when your quarantine ends     We can t give you an exact date as it depends on the above. You can calculate this on your own or work with your manager/staffing office to calculate this. (For example if you were exposed on 10/4, you would have to quarantine for 14 full days. That would be through 10/18. You could return on 10/19.)      If you receive a positive COVID-19 test result, follow the guidance of the those who are giving you the results. Usually the return to work is 10 (or in some cases 20 days from symptom onset.) If you work at [employer], you must also be cleared by Employee Occupational Health and Safety to return to work.        If you receive a negative COVID-19 test result and did not have a high risk exposure to someone with a known positive COVID-19 test, you can return to work once you're free of fever for 24 hours without fever-reducing medication and your symptoms are improving or resolved.      If you receive a negative COVID-19 test and If you had a high risk exposure to someone who has tested positive for COVID-19 then you  can return to work 14 days after your last contact with the positive individual    Note: If you have ongoing exposure to the covid positive person, this quarantine period may be more than 14 days. (For example, if you are continued to be exposed to your child who tested positive and cannot isolate from them, then the quarantine of 7-14 days can't start until your child is no longer contagious. This is typically 10 days from onset of the child's symptoms. So the total duration may be 17-24 days in this case.)    Sign up for [email or web address].   We know it's scary to hear that you might have COVID-19. We want to track your symptoms to make sure you're okay over the next 2 weeks. Please look for an email from [email or web address]--this is a free, online program that we'll use to keep in touch. To sign up, follow the link in the email you will receive. Learn more at [email or web address]    How can I take care of myself?    Get lots of rest. Drink extra fluids (unless a doctor has told you not to)    Take Tylenol (acetaminophen) or ibuprofen for fever or pain. If you have liver or kidney problems, ask your family doctor if it's okay to take Tylenol o ibuprofen    If you have other health problems (like cancer, heart failure, an organ transplant or severe kidney disease): Call your specialty clinic if you don't feel better in the next 2 days.    Know when to call 911. Emergency warning signs include:  o Trouble breathing or shortness of breath  o Pain or pressure in the chest that doesn't go away  o Feeling confused like you haven't felt before, or not being able to wake up  o Bluish-colored lips or face    Where can I get more information?  [facility] - About COVID-19:   [email or web address]    CDC - What to Do If You're Sick:   www.cdc.gov/coronavirus/2019-ncov/about/steps-when-sick.html

## 2021-06-19 NOTE — LETTER
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 Follow up testing was a chest xray performed on July 21, 2017:  This was normal with no evidence of active tuberculosis infection.      This patient was treated with nine months of INH (isoniazid) therapy.      Thank you for your attention to this matter.     Sincerely,        Meliza Gallegos MD

## 2021-06-20 NOTE — LETTER
Letter by Meliza Gallegos MD at      Author: Meliza Gallegos MD Service: -- Author Type: --    Filed:  Encounter Date: 9/18/2020 Status: (Other)       09/18/20    RE: Jessica Ramirez  2000    To Whom It May Concern:    Jessica Ramirez is suffering from daily anxiety symptoms.  This is exacerbated by social isolation and restrictions mandating solo living.  I believe she would benefit from having a pet /  animal.  She is capable of caring for this pet and I think it would help address some of her anxiety symptoms.    Thank you for your consideration of this matter.     Sincerely,        Meliza Gallegos MD

## 2021-09-14 ENCOUNTER — TELEPHONE (OUTPATIENT)
Dept: FAMILY MEDICINE | Facility: CLINIC | Age: 21
End: 2021-09-14

## 2021-11-05 DIAGNOSIS — Z30.41 ENCOUNTER FOR SURVEILLANCE OF CONTRACEPTIVE PILLS: Primary | ICD-10-CM

## 2021-11-08 NOTE — TELEPHONE ENCOUNTER
"Routing refill request to provider for review/approval because:  Medication requested by pharmacy does not match medication on patients list.       Last Written Prescription Date:  n/a  Last Fill Quantity: n/a,  # refills: n/a   Last office visit provider: 4/29/21     Requested Prescriptions   Pending Prescriptions Disp Refills     TRI-LO-JOHN 0.18/0.215/0.25 MG-25 MCG tablet [Pharmacy Med Name: TRI-LO-JHON TABLET] 84 tablet      Sig: TAKE 1 TABLET BY MOUTH EVERY DAY.       Contraceptives Protocol Passed - 11/5/2021 10:41 AM        Passed - Patient is not a current smoker if age is 35 or older        Passed - Recent (12 mo) or future (30 days) visit within the authorizing provider's specialty     Patient has had an office visit with the authorizing provider or a provider within the authorizing providers department within the previous 12 mos or has a future within next 30 days. See \"Patient Info\" tab in inbasket, or \"Choose Columns\" in Meds & Orders section of the refill encounter.              Passed - Medication is active on med list        Passed - No active pregnancy on record        Passed - No positive pregnancy test in past 12 months             Lauryn South RN 11/08/21 1:54 PM  "

## 2021-11-09 RX ORDER — NORGESTIMATE AND ETHINYL ESTRADIOL
KIT
Qty: 84 TABLET | Refills: 3 | Status: SHIPPED | OUTPATIENT
Start: 2021-11-09 | End: 2023-06-21

## 2023-05-20 ENCOUNTER — HEALTH MAINTENANCE LETTER (OUTPATIENT)
Age: 23
End: 2023-05-20

## 2023-06-21 ENCOUNTER — OFFICE VISIT (OUTPATIENT)
Dept: FAMILY MEDICINE | Facility: CLINIC | Age: 23
End: 2023-06-21
Payer: COMMERCIAL

## 2023-06-21 VITALS
HEIGHT: 62 IN | HEART RATE: 73 BPM | SYSTOLIC BLOOD PRESSURE: 102 MMHG | WEIGHT: 128.5 LBS | RESPIRATION RATE: 16 BRPM | TEMPERATURE: 98 F | OXYGEN SATURATION: 98 % | BODY MASS INDEX: 23.65 KG/M2 | DIASTOLIC BLOOD PRESSURE: 68 MMHG

## 2023-06-21 DIAGNOSIS — R41.840 DIFFICULTY CONCENTRATING: ICD-10-CM

## 2023-06-21 DIAGNOSIS — Z11.59 NEED FOR HEPATITIS C SCREENING TEST: ICD-10-CM

## 2023-06-21 DIAGNOSIS — D22.9 CHANGE IN COLOR OF SKIN MOLE: Primary | ICD-10-CM

## 2023-06-21 DIAGNOSIS — R53.83 OTHER FATIGUE: ICD-10-CM

## 2023-06-21 DIAGNOSIS — Z11.3 SCREENING FOR STDS (SEXUALLY TRANSMITTED DISEASES): ICD-10-CM

## 2023-06-21 LAB
BASOPHILS # BLD AUTO: 0 10E3/UL (ref 0–0.2)
BASOPHILS NFR BLD AUTO: 0 %
EOSINOPHIL # BLD AUTO: 0 10E3/UL (ref 0–0.7)
EOSINOPHIL NFR BLD AUTO: 0 %
ERYTHROCYTE [DISTWIDTH] IN BLOOD BY AUTOMATED COUNT: 12.2 % (ref 10–15)
HCT VFR BLD AUTO: 40.7 % (ref 35–47)
HGB BLD-MCNC: 13.1 G/DL (ref 11.7–15.7)
IMM GRANULOCYTES # BLD: 0 10E3/UL
IMM GRANULOCYTES NFR BLD: 0 %
LYMPHOCYTES # BLD AUTO: 1.8 10E3/UL (ref 0.8–5.3)
LYMPHOCYTES NFR BLD AUTO: 34 %
MCH RBC QN AUTO: 30.4 PG (ref 26.5–33)
MCHC RBC AUTO-ENTMCNC: 32.2 G/DL (ref 31.5–36.5)
MCV RBC AUTO: 94 FL (ref 78–100)
MONOCYTES # BLD AUTO: 0.3 10E3/UL (ref 0–1.3)
MONOCYTES NFR BLD AUTO: 6 %
NEUTROPHILS # BLD AUTO: 3.1 10E3/UL (ref 1.6–8.3)
NEUTROPHILS NFR BLD AUTO: 59 %
PLATELET # BLD AUTO: 297 10E3/UL (ref 150–450)
RBC # BLD AUTO: 4.31 10E6/UL (ref 3.8–5.2)
TSH SERPL DL<=0.005 MIU/L-ACNC: 1.79 UIU/ML (ref 0.3–4.2)
WBC # BLD AUTO: 5.2 10E3/UL (ref 4–11)

## 2023-06-21 PROCEDURE — 87491 CHLMYD TRACH DNA AMP PROBE: CPT | Performed by: FAMILY MEDICINE

## 2023-06-21 PROCEDURE — 85025 COMPLETE CBC W/AUTO DIFF WBC: CPT | Performed by: FAMILY MEDICINE

## 2023-06-21 PROCEDURE — 84443 ASSAY THYROID STIM HORMONE: CPT | Performed by: FAMILY MEDICINE

## 2023-06-21 PROCEDURE — 99214 OFFICE O/P EST MOD 30 MIN: CPT | Performed by: FAMILY MEDICINE

## 2023-06-21 PROCEDURE — 36415 COLL VENOUS BLD VENIPUNCTURE: CPT | Performed by: FAMILY MEDICINE

## 2023-06-21 PROCEDURE — 87591 N.GONORRHOEAE DNA AMP PROB: CPT | Performed by: FAMILY MEDICINE

## 2023-06-21 NOTE — PROGRESS NOTES
Assessment & Plan     Change in color of skin mole  She was referred to dermatology for further assessment and management.  - Adult Dermatology Referral; Future    Difficulty concentrating  Refer to mental health for diagnostic testing, she stating that if she has a diagnosis she would like to manage it without pharmacological treatment.  - Adult Mental Health  Referral; Future    Need for hepatitis C screening test      Screening for STDs (sexually transmitted diseases)    - NEISSERIA GONORRHOEA PCR; Future  - CHLAMYDIA TRACHOMATIS PCR; Future  - NEISSERIA GONORRHOEA PCR  - CHLAMYDIA TRACHOMATIS PCR    Other fatigue    - TSH with free T4 reflex; Future  - CBC with platelets and differential; Future  - TSH with free T4 reflex  - CBC with platelets and differential    Review of external notes as documented elsewhere in note  40 minutes spent by me on the date of the encounter doing chart review, review of outside records, review of test results, interpretation of tests, patient visit and documentation        Regular exercise    Kaveh Young MD  [facility]    Darshan Lopez is a 23 year old, presenting for the following health issues:  A.D.H.D and Mole        6/21/2023     4:06 PM   Additional Questions   Roomed by Luda     History of Present Illness       Reason for visit:  Mole concern and adhd  Symptom onset:  More than a month  Symptoms include:  Forgetfulness difficulty staying organize inability to focus  Symptom intensity:  Moderate  Symptom progression:  Staying the same  Had these symptoms before:  Yes  Has tried/received treatment for these symptoms:  No  What makes it worse:  Stress  What makes it better:  Consistency    She eats 2-3 servings of fruits and vegetables daily.She consumes 0 sweetened beverage(s) daily.She exercises with enough effort to increase her heart rate 9 or less minutes per day.  She exercises with enough effort to increase her heart rate 3 or  "less days per week.   She is taking medications regularly.       She is here today with change in color of mole at the left wrist near the thumb area, used to be small but  got bigger over time.  Similar thing happened to a mole at the right  arm area, has seen a dermatologist in the past and was told that they were benign.  She is concerned about the recent change.  She also noticed a new mole at the left mid face area, that 1 appeared dry, brownish.  She is also concerned about concentration, she is wondering if she has ADHD and would like to be tested.Friends making fun of her that she loose thing  easily, she works as a nurse at the ER, s she is having a  hard time going from ABCD, sometimes goes from A to C and D to B  etc. does not seems to affecting her performance that much, but she takes more time.  At the end of the visit she also mention a vague fatigue with history of anemia and would like a CBC and thyroid to be checked      Review of Systems   Constitutional, HEENT, cardiovascular, pulmonary, gi and gu systems are negative, except as otherwise noted.      Objective    /68 (BP Location: Left arm, Patient Position: Sitting, Cuff Size: Adult Regular)   Pulse 73   Temp 98  F (36.7  C) (Temporal)   Resp 16   Ht 1.575 m (5' 2\")   Wt 58.3 kg (128 lb 8 oz)   LMP 06/12/2023 (Approximate)   SpO2 98%   BMI 23.50 kg/m    Body mass index is 23.5 kg/m .  Physical Exam   GENERAL: healthy, alert and no distress  NECK: no adenopathy, no asymmetry, masses, or scars and thyroid normal to palpation  RESP: lungs clear to auscultation - no rales, rhonchi or wheezes  CV: regular rate and rhythm, normal S1 S2, no S3 or S4, no murmur, click or rub, no peripheral edema and peripheral pulses strong  ABDOMEN: soft, nontender, no hepatosplenomegaly, no masses and bowel sounds normal  MS: no gross musculoskeletal defects noted, no edema    Results for orders placed or performed in visit on 06/21/23   TSH with free T4 " reflex     Status: Normal   Result Value Ref [facility]    TSH 1.79 0.30 - 4.20 uIU/mL   CBC with platelets and differential     Status: None   Result Value Ref [facility]    WBC Count 5.2 4.0 - 11.0 10e3/uL    RBC Count 4.31 3.80 - 5.20 10e6/uL    Hemoglobin 13.1 11.7 - 15.7 g/dL    Hematocrit 40.7 35.0 - 47.0 %    MCV 94 78 - 100 fL    MCH 30.4 26.5 - 33.0 pg    MCHC 32.2 31.5 - 36.5 g/dL    RDW 12.2 10.0 - 15.0 %    Platelet Count 297 150 - 450 10e3/uL    % Neutrophils 59 %    % Lymphocytes 34 %    % Monocytes 6 %    % Eosinophils 0 %    % Basophils 0 %    % Immature Granulocytes 0 %    Absolute Neutrophils 3.1 1.6 - 8.3 10e3/uL    Absolute Lymphocytes 1.8 0.8 - 5.3 10e3/uL    Absolute Monocytes 0.3 0.0 - 1.3 10e3/uL    Absolute Eosinophils 0.0 0.0 - 0.7 10e3/uL    Absolute Basophils 0.0 0.0 - 0.2 10e3/uL    Absolute Immature Granulocytes 0.0 <=0.4 10e3/uL   CBC with platelets and differential     Status: None    Narrative    The following orders were created for panel order CBC with platelets and differential.  Procedure                               Abnormality         Status                     ---------                               -----------         ------                     CBC with platelets and d...[[id number]]                      Final result                 Please view results for these tests on the individual orders.       This note was completed in part using a voice recognition software, any grammatical or context distortion are unintentional and inherent to the software.         Prior to immunization administration, verified patients identity using patient s name and date of birth. Please see Immunization Activity for additional information.     Screening Questionnaire for Adult Immunization    Are you sick today?   No   Do you have allergies to medications, food, a vaccine component or latex?   No   Have you ever had a serious reaction after receiving a vaccination?   No   Do you have a long-term health  problem with heart, lung, kidney, or metabolic disease (e.g., diabetes), asthma, a blood disorder, no spleen, complement component deficiency, a cochlear implant, or a spinal fluid leak?  Are you on long-term aspirin therapy?   No   Do you have cancer, leukemia, HIV/AIDS, or any other immune system problem?   No   Do you have a parent, brother, or sister with an immune system problem?   No   In the past 3 months, have you taken medications that affect  your immune system, such as prednisone, other steroids, or anticancer drugs; drugs for the treatment of rheumatoid arthritis, Crohn s disease, or psoriasis; or have you had radiation treatments?   No   Have you had a seizure, or a brain or other nervous system problem?   No   During the past year, have you received a transfusion of blood or blood    products, or been given immune (gamma) globulin or antiviral drug?   No   For women: Are you pregnant or is there a chance you could become       pregnant during the next month?   No   Have you received any vaccinations in the past 4 weeks?   No     Immunization questionnaire answers were all negative.      Screening performed by Luda Steele RN on 6/21/2023 at 4:10 PM.

## 2023-06-22 LAB
C TRACH DNA SPEC QL NAA+PROBE: NEGATIVE
N GONORRHOEA DNA SPEC QL NAA+PROBE: NEGATIVE

## 2023-09-22 ENCOUNTER — OFFICE VISIT (OUTPATIENT)
Dept: FAMILY MEDICINE | Facility: CLINIC | Age: 23
End: 2023-09-22
Payer: COMMERCIAL

## 2023-09-22 VITALS
HEART RATE: 59 BPM | SYSTOLIC BLOOD PRESSURE: 95 MMHG | HEIGHT: 62 IN | RESPIRATION RATE: 16 BRPM | DIASTOLIC BLOOD PRESSURE: 67 MMHG | OXYGEN SATURATION: 97 % | BODY MASS INDEX: 23.92 KG/M2 | TEMPERATURE: 97.1 F | WEIGHT: 130 LBS

## 2023-09-22 DIAGNOSIS — R41.840 DIFFICULTY CONCENTRATING: Primary | ICD-10-CM

## 2023-09-22 DIAGNOSIS — F41.9 ANXIETY: ICD-10-CM

## 2023-09-22 PROCEDURE — 99214 OFFICE O/P EST MOD 30 MIN: CPT | Performed by: FAMILY MEDICINE

## 2023-09-22 RX ORDER — BUPROPION HYDROCHLORIDE 150 MG/1
150 TABLET ORAL EVERY MORNING
Qty: 30 TABLET | Refills: 1 | Status: SHIPPED | OUTPATIENT
Start: 2023-09-22 | End: 2023-10-20

## 2023-09-22 ASSESSMENT — PAIN SCALES - GENERAL: PAINLEVEL: NO PAIN (0)

## 2023-09-22 NOTE — PROGRESS NOTES
"  1. Difficulty concentrating  2. Anxiety  This is a 22 yo female with significant anxiety, but also ADHD concerns.  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  She has struggled with multiple med/vaccine errors.  Finds it difficult to follow through on any task.  She has an appointment for evaluation later this year, but doesn't feel she can \"hang in there\" that long.  She \"forgets\" to go to work (forgets she is scheduled for that shift), she is having difficulty with job performance.  This creates considerable anxiety as well.   I think she needs counseling, I think she does need some help in the interim due to her performance.  After long discussion, we opted to treat with Bupropion to address both her underlying anxiety;  but, she is aware this does have a designation for ADHD treatment as well.  So - this may be helpful for both.  We will re-write her referral and see if she can get an evaluation sooner than late December.    - Adult Mental Health  Referral; Future  - Adult Mental Health  Referral; Future  - buPROPion (WELLBUTRIN XL) 150 MG 24 hr tablet; Take 1 tablet (150 mg) by mouth every morning  Dispense: 30 tablet; Refill: 1      Subjective   Jessica is a 23 year old, presenting for the following health issues:  Referral (For ADHD)      9/22/2023     7:11 AM   Additional Questions   Roomed by EDWIN Chun psych referral  Wonders about ADHD   Has issue with attention span  Feels like dealing with \"everything\" all at once  When doing a certain task - has to do everything all at once, or then forgets about the things she's supposed to do   Feels like she's had this for a long time    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)  Trouble paying attention to small details  Gets distracted - what's in her mind disappears    At work, schedule - missed certain days of work because of inability to pay attention to details " "- has one no call, no show  Has forgotten she works one day, goes in late    Has made med errors -   Was giving rabies vaccine - got schedule mixed up  -   Made at least 4 -6 med errors (no harm to patient) in less t [place] 1 year  Working at clinic - doesn't have extra steps to check -     Took more effort than her peers, in school  Passed her boards on first try    In college, would misplace things -   Friends would make fun of her -     Thought process is jumbled -  Works at [employer] - part time  And 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  She exercises with enough effort to increase her heart rate 3 or less days per week.   She is taking medications regularly.         Review of Systems   Constitutional:  Positive for fatigue. Negative for activity change, chills and fever.   HENT: Negative.     Eyes:  Negative for visual disturbance.   Respiratory: Negative.  Negative for cough and shortness of breath.    Cardiovascular: Negative.  Negative for chest pain.   Gastrointestinal: Negative.  Negative for abdominal pain.   Endocrine: Negative.    Genitourinary: Negative.    Musculoskeletal: Negative.    Skin: Negative.    Allergic/Immunologic: Negative.    Neurological: Negative.    Hematological:  Does not bruise/bleed easily.   Psychiatric/Behavioral:  Positive for decreased concentration and sleep disturbance. The patient is nervous/anxious.    All other systems reviewed and are negative.           Objective    BP 95/67 (BP Location: Left arm, Patient " "Position: Sitting, Cuff Size: Adult Regular)   Pulse 59   Temp 97.1  F (36.2  C) (Temporal)   Resp 16   Ht 1.57 m (5' 1.81\")   Wt 59 kg (130 lb)   LMP 08/28/2023 (Approximate)   SpO2 97%   BMI 23.92 kg/m    Body mass index is 23.92 kg/m .  Physical Exam  Vitals reviewed.   Constitutional:       General: She is not in acute distress.     Appearance: Normal appearance.   HENT:      Head: Normocephalic.      Right Ear: Tympanic membrane, ear canal and external ear normal.      Left Ear: Tympanic membrane, ear canal and external ear normal.      Nose: Nose normal.      Mouth/Throat:      Mouth: Mucous membranes are moist.      Pharynx: No posterior oropharyngeal erythema.   Eyes:      Extraocular Movements: Extraocular movements intact.      Conjunctiva/sclera: Conjunctivae normal.      Pupils: Pupils are equal, round, and reactive to light.   Cardiovascular:      Rate and Rhythm: Normal rate and regular rhythm.      Pulses: Normal pulses.      Heart sounds: Normal heart sounds. No murmur heard.  Pulmonary:      Effort: Pulmonary effort is normal.      Breath sounds: Normal breath sounds.   Abdominal:      Palpations: Abdomen is soft. There is no mass.      Tenderness: There is no abdominal tenderness. There is no guarding or rebound.   Musculoskeletal:         General: No deformity. Normal range of motion.      Cervical back: Normal range of motion and neck supple.   Lymphadenopathy:      Cervical: No cervical adenopathy.   Skin:     General: Skin is warm and dry.   Neurological:      General: No focal deficit present.      Mental Status: She is alert.   Psychiatric:         Mood and Affect: Mood normal.         Behavior: Behavior normal.            No results found for any visits on 09/22/23.    Prior to immunization administration, verified patients identity using patient s name and date of birth. Please see Immunization Activity for additional information.     Screening Questionnaire for Adult " Immunization    Are you sick today?   No   Do you have allergies to medications, food, a vaccine component or latex?   No   Have you ever had a serious reaction after receiving a vaccination?   No   Do you have a long-term health problem with heart, lung, kidney, or metabolic disease (e.g., diabetes), asthma, a blood disorder, no spleen, complement component deficiency, a cochlear implant, or a spinal fluid leak?  Are you on long-term aspirin therapy?   No   Do you have cancer, leukemia, HIV/AIDS, or any other immune system problem?   No   Do you have a parent, brother, or sister with an immune system problem?   No   In the past 3 months, have you taken medications that affect  your immune system, such as prednisone, other steroids, or anticancer drugs; drugs for the treatment of rheumatoid arthritis, Crohn s disease, or psoriasis; or have you had radiation treatments?   No   Have you had a seizure, or a brain or other nervous system problem?   No   During the past year, have you received a transfusion of blood or blood    products, or been given immune (gamma) globulin or antiviral drug?   No   For women: Are you pregnant or is there a chance you could become       pregnant during the next month?   No   Have you received any vaccinations in the past 4 weeks?   No     Immunization questionnaire answers were all negative.        Screening performed by Roxy Garza MA on 9/22/2023 at 7:15 AM.

## 2023-09-24 ASSESSMENT — ENCOUNTER SYMPTOMS
MUSCULOSKELETAL NEGATIVE: 1
COUGH: 0
FEVER: 0
ENDOCRINE NEGATIVE: 1
DECREASED CONCENTRATION: 1
RESPIRATORY NEGATIVE: 1
ALLERGIC/IMMUNOLOGIC NEGATIVE: 1
NERVOUS/ANXIOUS: 1
ACTIVITY CHANGE: 0
GASTROINTESTINAL NEGATIVE: 1
NEUROLOGICAL NEGATIVE: 1
FATIGUE: 1
CARDIOVASCULAR NEGATIVE: 1
CHILLS: 0
SLEEP DISTURBANCE: 1
BRUISES/BLEEDS EASILY: 0
SHORTNESS OF BREATH: 0
ABDOMINAL PAIN: 0

## 2023-10-20 ENCOUNTER — TELEPHONE (OUTPATIENT)
Dept: FAMILY MEDICINE | Facility: CLINIC | Age: 23
End: 2023-10-20
Payer: COMMERCIAL

## 2023-10-20 DIAGNOSIS — F41.9 ANXIETY: ICD-10-CM

## 2023-10-20 DIAGNOSIS — R41.840 DIFFICULTY CONCENTRATING: ICD-10-CM

## 2023-10-20 RX ORDER — BUPROPION HYDROCHLORIDE 150 MG/1
150 TABLET ORAL EVERY MORNING
Qty: 90 TABLET | Refills: 1 | Status: SHIPPED | OUTPATIENT
Start: 2023-10-20

## 2023-10-20 NOTE — TELEPHONE ENCOUNTER
New Medication Request    Contacts         Type Contact Phone/Fax    10/20/2023 01:00 PM CDT Phone (Incoming) [id number] - [place], [place] - [address] (Pharmacy) [phone]            What medication are you requesting?: buPROPion (WELLBUTRIN XL) 150 MG 24 hr tablet     Reason for medication request: pharmacy requesting a 90 day prescription    Have you taken this medication before?: Yes    Controlled Substance Agreement on file:   CSA -- Patient Level:    CSA: None found at the patient level.         Patient offered an appointment? No    Preferred Pharmacy:  [id number] - [place], [place] - [address] [place] [address]  Phone: [phone] Fax: [phone]      Could we send this information to you in [facility] or would you prefer to receive a phone call?:   Patient would prefer a phone call   Okay to leave a detailed message?: Yes at Home number on file [phone] (home)

## 2024-07-27 ENCOUNTER — HEALTH MAINTENANCE LETTER (OUTPATIENT)
Age: 24
End: 2024-07-27

## 2025-08-10 ENCOUNTER — HEALTH MAINTENANCE LETTER (OUTPATIENT)
Age: 25
End: 2025-08-10